# Patient Record
Sex: FEMALE | Race: WHITE | NOT HISPANIC OR LATINO | Employment: FULL TIME | URBAN - METROPOLITAN AREA
[De-identification: names, ages, dates, MRNs, and addresses within clinical notes are randomized per-mention and may not be internally consistent; named-entity substitution may affect disease eponyms.]

---
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
## 2022-02-23 ENCOUNTER — HOSPITAL ENCOUNTER (EMERGENCY)
Facility: CLINIC | Age: 22
Discharge: HOME | End: 2022-02-23
Attending: EMERGENCY MEDICINE | Admitting: EMERGENCY MEDICINE
Payer: OTHER MISCELLANEOUS

## 2022-02-23 VITALS
TEMPERATURE: 98.4 F | OXYGEN SATURATION: 100 % | RESPIRATION RATE: 16 BRPM | HEART RATE: 61 BPM | DIASTOLIC BLOOD PRESSURE: 71 MMHG | SYSTOLIC BLOOD PRESSURE: 118 MMHG

## 2022-02-23 DIAGNOSIS — S06.0X0A CONCUSSION WITHOUT LOSS OF CONSCIOUSNESS, INITIAL ENCOUNTER: ICD-10-CM

## 2022-02-23 PROCEDURE — 250N000013 HC RX MED GY IP 250 OP 250 PS 637: Performed by: EMERGENCY MEDICINE

## 2022-02-23 PROCEDURE — 99283 EMERGENCY DEPT VISIT LOW MDM: CPT | Performed by: EMERGENCY MEDICINE

## 2022-02-23 PROCEDURE — 250N000011 HC RX IP 250 OP 636: Performed by: EMERGENCY MEDICINE

## 2022-02-23 RX ORDER — EPINEPHRINE 0.3 MG/.3ML
0.3 INJECTION SUBCUTANEOUS
COMMUNITY
Start: 2021-07-14

## 2022-02-23 RX ORDER — CLONIDINE HYDROCHLORIDE 0.1 MG/1
0.1 TABLET ORAL AT BEDTIME
COMMUNITY
Start: 2022-01-22 | End: 2023-11-22

## 2022-02-23 RX ORDER — IBUPROFEN 600 MG/1
600 TABLET, FILM COATED ORAL ONCE
Status: COMPLETED | OUTPATIENT
Start: 2022-02-23 | End: 2022-02-23

## 2022-02-23 RX ORDER — ONDANSETRON 4 MG/1
4 TABLET, ORALLY DISINTEGRATING ORAL ONCE
Status: COMPLETED | OUTPATIENT
Start: 2022-02-23 | End: 2022-02-23

## 2022-02-23 RX ORDER — SUMATRIPTAN 100 MG/1
100 TABLET, FILM COATED ORAL
COMMUNITY
Start: 2021-02-23 | End: 2022-02-23

## 2022-02-23 RX ORDER — FLUOXETINE 40 MG/1
40 CAPSULE ORAL AT BEDTIME
COMMUNITY
Start: 2022-01-22

## 2022-02-23 RX ADMIN — IBUPROFEN 600 MG: 600 TABLET, FILM COATED ORAL at 19:20

## 2022-02-23 RX ADMIN — ONDANSETRON 4 MG: 4 TABLET, ORALLY DISINTEGRATING ORAL at 19:20

## 2022-02-23 NOTE — LETTER
February 23, 2022      To Whom It May Concern:      Riana Ashton was seen in our Emergency Department today, 02/23/22.  I expect her condition to improve over the next 5 days.  She may return to work when improved.    Sincerely,        Aldo Fabian MD

## 2022-02-24 NOTE — ED PROVIDER NOTES
History     Chief Complaint   Patient presents with     Head Injury     HPI  Riana Ashton is a 21 year old female who presents for evaluation of a head injury.  She was working at the [employer] when she turned her head suddenly and smacked her right forehead on a locker handle.  She had sudden onset of headache.  She feels fuzzy.  She has had no loss of consciousness.  No vomiting.  She has had some nausea.  History of probable concussions in the past but not formally diagnosed she states.  Headache is currently 6 out of 10    Allergies:  Allergies   Allergen Reactions     Bee Venom Anaphylaxis     Nickel Rash       Problem List:    There are no problems to display for this patient.       Past Medical History:    Past Medical History:   Diagnosis Date     Fibromyalgia 2018     Migraine      PONV (postoperative nausea and vomiting)        Past Surgical History:    Past Surgical History:   Procedure Laterality Date     ARTHROSCOPY KNEE WITH FIXATION OF OSTEOCHONDRAL DISSECANS Left 3/9/2018    Procedure: ARTHROSCOPY KNEE WITH FIXATION OF OSTEOCHONDRAL DISSECANS;  1.  Left knee arthroscopic osteochondritis dissecans lesion fixation.   2.  Bone grafting and bone marrow aspirate concentrate harvest and application.   ;  Surgeon: Johnny Milner MD;  Location:  OR     ORTHOPEDIC SURGERY      fx femur lt. with surgery and then removal of pins     OSTEOCHONDRAL ALLOGRAFT TRANSFER SYSTEM PROCEDURE KNEE Left 2018    Procedure: Left knee arthroscopy, removal of loose body, osteochondral allograft transplant of the medial femoral condyle;  Surgeon: Johnny Milner MD;  Location:  OR       Family History:    History reviewed. No pertinent family history.    Social History:  Marital Status:  Single [1]  Social History     Tobacco Use     Smoking status: Former Smoker     Packs/day: 0.25     Types: Cigarettes     Quit date: 2021     Years since quittin.2     Smokeless tobacco: Former User   Substance Use  Topics     Alcohol use: Yes     Comment: beer rarely with friends     Drug use: No        Medications:    acetaminophen (TYLENOL) 325 MG tablet  aspirin-acetaminophen-caffeine (EXCEDRIN MIGRAINE) 250-250-65 MG tablet  cloNIDine (CATAPRES) 0.1 MG tablet  EPINEPHrine (ANY BX GENERIC EQUIV) 0.3 MG/0.3ML injection 2-pack  erenumab-aooe (AIMOVIG) 70 MG/ML injection  FLUoxetine (PROZAC) 40 MG capsule  levonorgestrel (KYLEENA) 19.5 MG IUD  ondansetron (ZOFRAN-ODT) 4 MG ODT tab  SUMAtriptan (IMITREX) 100 MG tablet  tiZANidine (ZANAFLEX) 4 MG tablet  ibuprofen (ADVIL/MOTRIN) 600 MG tablet  oxyCODONE IR (ROXICODONE) 5 MG tablet  senna-docusate (SENOKOT-S;PERICOLACE) 8.6-50 MG per tablet          Review of Systems  All other systems are reviewed and are negative    Physical Exam          Physical Exam  Vitals and nursing note reviewed.   Constitutional:       General: She is not in acute distress.     Appearance: She is well-developed. She is not diaphoretic.   HENT:      Head:      Comments: Abrasion to the right forehead.  No deformity.  No significant swelling.  No bony step-off or crepitus.  Eyes:      General: No scleral icterus.     Pupils: Pupils are equal, round, and reactive to light.   Cardiovascular:      Rate and Rhythm: Normal rate and regular rhythm.      Heart sounds: Normal heart sounds. No murmur heard.  Pulmonary:      Effort: No respiratory distress.      Breath sounds: No stridor. No wheezing or rales.   Abdominal:      Palpations: Abdomen is soft.      Tenderness: There is no abdominal tenderness.   Musculoskeletal:         General: No tenderness.      Cervical back: Normal range of motion and neck supple.   Skin:     General: Skin is warm and dry.      Coloration: Skin is not pale.      Findings: No erythema or rash.   Neurological:      Mental Status: She is alert.         ED Course            Procedures              Critical Care time:  none        8:00 PM patient feeling improved.  No vomiting.   Moderate headache.  Wanting to go home       No results found for this or any previous visit (from the past 24 hour(s)).    Medications   ibuprofen (ADVIL/MOTRIN) tablet 600 mg (600 mg Oral Given 2/23/22 1920)   ondansetron (ZOFRAN-ODT) ODT tab 4 mg (4 mg Oral Given 2/23/22 1920)       Assessments & Plan (with Medical Decision Making)  21-year-old female presents after head injury as described above striking her head on a locker handle.  No loss of consciousness, neurological deficits.  Does not appear likely to have skull fracture or intracranial hemorrhage.  Do suspect she suffered a concussion.  Have recommended some rest and work note given.  Tylenol ibuprofen as needed.  She has Zofran as needed for nausea.  Return if repetitive vomiting or other concern.     I have reviewed the nursing notes.    I have reviewed the findings, diagnosis, plan and need for follow up with the patient.       New Prescriptions    No medications on file       Final diagnoses:   Concussion without loss of consciousness, initial encounter       2/23/2022   [facility] EMERGENCY DEPT     Aldo Fabian MD  02/2000
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
## 2023-11-21 ENCOUNTER — APPOINTMENT (OUTPATIENT)
Dept: GENERAL RADIOLOGY | Facility: CLINIC | Age: 23
End: 2023-11-21
Attending: EMERGENCY MEDICINE
Payer: OTHER MISCELLANEOUS

## 2023-11-21 ENCOUNTER — HOSPITAL ENCOUNTER (EMERGENCY)
Facility: CLINIC | Age: 23
Discharge: HOME | End: 2023-11-21
Attending: EMERGENCY MEDICINE | Admitting: EMERGENCY MEDICINE
Payer: OTHER MISCELLANEOUS

## 2023-11-21 VITALS
OXYGEN SATURATION: 100 % | HEART RATE: 74 BPM | HEIGHT: 67 IN | SYSTOLIC BLOOD PRESSURE: 137 MMHG | WEIGHT: 149 LBS | BODY MASS INDEX: 23.39 KG/M2 | TEMPERATURE: 98.4 F | RESPIRATION RATE: 18 BRPM | DIASTOLIC BLOOD PRESSURE: 92 MMHG

## 2023-11-21 DIAGNOSIS — M24.811 INTERNAL DERANGEMENT OF RIGHT SHOULDER: ICD-10-CM

## 2023-11-21 PROCEDURE — 250N000013 HC RX MED GY IP 250 OP 250 PS 637: Performed by: EMERGENCY MEDICINE

## 2023-11-21 PROCEDURE — 99283 EMERGENCY DEPT VISIT LOW MDM: CPT | Performed by: EMERGENCY MEDICINE

## 2023-11-21 PROCEDURE — 73030 X-RAY EXAM OF SHOULDER: CPT | Mod: RT

## 2023-11-21 RX ORDER — ACETAMINOPHEN 325 MG/1
975 TABLET ORAL ONCE
Status: COMPLETED | OUTPATIENT
Start: 2023-11-21 | End: 2023-11-21

## 2023-11-21 RX ADMIN — ACETAMINOPHEN 975 MG: 325 TABLET, FILM COATED ORAL at 10:54

## 2023-11-21 ASSESSMENT — ACTIVITIES OF DAILY LIVING (ADL): ADLS_ACUITY_SCORE: 33

## 2023-11-21 NOTE — ED TRIAGE NOTES
Reports right shoulder pain from a work related injury.      Triage Assessment (Adult)       Row Name 11/21/23 1024          Triage Assessment    Airway WDL WDL        Respiratory WDL    Respiratory WDL WDL        Skin Circulation/Temperature WDL    Skin Circulation/Temperature WDL WDL        Cardiac WDL    Cardiac WDL WDL        Peripheral/Neurovascular WDL    Peripheral Neurovascular WDL WDL        Cognitive/Neuro/Behavioral WDL    Cognitive/Neuro/Behavioral WDL WDL

## 2023-11-21 NOTE — DISCHARGE INSTRUCTIONS
Your x-ray looked good.  I recommend conservative care with rest ice and range of motion as tolerated.  I recommend no heavy lifting until your symptoms have resolved.  I did put in a referral to sports medicine for follow-up.  Hopefully this gets better quickly.
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
## 2023-11-21 NOTE — LETTER
November 21, 2023      To Whom It May Concern:      Riana Ashton was seen in our Emergency Department today, 11/21/23.  I expect her condition to improve over the next 7-10 days.  She may return to work when improved but must rest the right arm until symptoms have resolved.     Sincerely,        Dylan Leonardo MD
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
## 2023-11-22 ENCOUNTER — OFFICE VISIT (OUTPATIENT)
Dept: ORTHOPEDICS | Facility: CLINIC | Age: 23
End: 2023-11-22
Attending: EMERGENCY MEDICINE
Payer: OTHER MISCELLANEOUS

## 2023-11-22 DIAGNOSIS — S46.911A SHOULDER STRAIN, RIGHT, INITIAL ENCOUNTER: Primary | ICD-10-CM

## 2023-11-22 DIAGNOSIS — S16.1XXA CERVICAL STRAIN, ACUTE, INITIAL ENCOUNTER: ICD-10-CM

## 2023-11-22 PROCEDURE — 99204 OFFICE O/P NEW MOD 45 MIN: CPT | Performed by: PHYSICIAN ASSISTANT

## 2023-11-22 RX ORDER — PREDNISONE 20 MG/1
TABLET ORAL
Qty: 20 TABLET | Refills: 0 | Status: SHIPPED | OUTPATIENT
Start: 2023-11-22 | End: 2023-12-05

## 2023-11-22 NOTE — LETTER
REPORT OF WORK COMP    [facility] [place]  [address]  [address]  [place] [address]  [phone]      PATIENT DATA    Employee Name: Riana Ashton      : 2000     #: [id number]    Work related injury: Yes  Employer at time of injury: [employer]   Employer contact & phone: unknown  Employed elsewhere? No  Workers' Compensation Carrier/Managed Care Plan:  Unknown    Today's date: 2023  Date of injury: 23  Date of first visit: 2023      PROVIDER EVALUATION: Please fill in as needed.  Please give copy to employee for employer.    1. Diagnosis: Right-sided cervical neck pain/strain, right side shoulder strain with possible rotator cuff injury    2. Treatment: Patient to start oral prednisone taper, tizanidine muscle relaxer, work restrictions, supplemental Tylenol for pain, rest, ice and topical muscle rubs as needed.  3. Medication: Prednisone oral taper, tizanidine muscle relaxer, Tylenol and topical muscle rubs  NOTE: When ordering a medication, [place] Rules require [place] or WC on prescriptions.    4. No work from: N/A  5. Return to work date: 2023     ** WITH RESTRICTIONS?  Yes, patient may do unlimited seated/clerical duties.  No use of right upper extremity for lifting more than 2 pounds with the right hand.  No activities above shoulder height.  No internal rotation of the shoulder past lateral hip level (reaching behind her back)      RESTRICTIONS: Unlimited unless listed.  Restrictions apply to home and leisure also.  If work restrictions is not available, the employee is totally disabled.    Maximum Medical Improvement (Date): TBD   Any Permanent Partial Disability? Deferred to future exam/consult.    Provider comments: Patient was reassessed in 2 weeks    Medical Examiner: Benoit Nazario PA-C            License or registration: [place] [id number]    Next appointment: 2 weeks    CC: Employer, [employer]  Plan/Payor, Patient

## 2023-11-22 NOTE — PROGRESS NOTES
ASSESSMENT & PLAN     Today we discussed the underlying etiology/pathology of patient's   1. Shoulder strain, right, initial encounter    2. Cervical strain, acute, initial encounter      -We discussed that patient has had only minimal improvement 10% of her cervical and right shoulder discomfort symptoms.  She does have intermittent numbness and tingling involving the right upper extremity which she believes affects all of her digits on her right hand.  - We will send the patient for right shoulder MRI to evaluate for rotator cuff pathology.  Cannot rule out cervical pathology or possible cervical radiculopathy at this time.  Patient is to pay attention to numbness/tingling symptoms to identify exact potential dermatome in which fingers are involved and will give me an update over the next 5 days  - Patient to continue tizanidine to help with cervical spine tightness and symptoms  - I will contact the patient with MRI results of her right shoulder when known and further treatment plan will be updated.  If inflammation is noted with no other structural abnormality we will proceed with possible right shoulder subacromial cortisone injection with rehab/PT program.  - We will continue to monitor cervical spine for possible cervical radiculopathy.  - Workability form updated maintaining current work restrictions.  -I did speak with Jayjay the patient's work comp carrier/representative today.  Clinic notes have not been received yet.  MRI will not be authorized until review of clinic notes which were faxed to Jayjay today from visit 11/23/2023 and today's visit.    -Call direct clinic number [[phone]] at any time with questions or concerns in regards to your recent office visit with me.     Benoit Nazario PA-C  [facility]      SUBJECTIVE  Riana RANJITH Poli is a/an 23 year old female who is seen for follow up of right shoulder injury. The patient is seen by themselves.  This is a WORK COMP.  INJURY/CLAIM  Date of injury: 23  Employer: [employer]  Is patient employed/working at any other location?: NO      Since last visit on 23 patient has mild improvement in pain and function for her cervical spine and right shoulder. Patient reports a new burning pain in the shoulder joint and underneath the shoulder blade with numbness/tingling involving the right hand, she states it involves all digits but has not paid attention specifically to which digits are involved.  She denies any symptoms on the left upper extremity.  Range of motion of the shoulder on the right side has improved but continues to be painful above shoulder height.  She has been off work since last evaluation.      Has previous treatment plan been beneficial for condition: yes   Location of Pain: right shoulder and cervical neck  Rating of Pain at worst: 7/10  Rating of Pain Currently: 4/10  Worsened by: shoulder flexion of the right shoulder above shoulder height and movement of the cervical spine  Better with: mild with treatments tried  Treatments tried: rest/activity avoidance, ice, other medications: Prednisone (Medrol), and home exercises and tizanidine as needed-6 tablets utilized  Quality: aching, sharp, stabbing, numb, burning, radiating  Associated symptoms: numbness, tingling, weakness of the arm with use, locking or catching, and feeling of instability      Past Medical History:   Diagnosis Date    Fibromyalgia 2018    Migraine     PONV (postoperative nausea and vomiting)      Social History     Socioeconomic History    Marital status: Single   Tobacco Use    Smoking status: Former     Packs/day: .25     Types: Cigarettes     Quit date: 2021     Years since quittin.9    Smokeless tobacco: Former   Substance and Sexual Activity    Alcohol use: Yes     Comment: beer rarely with friends    Drug use: No    Sexual activity: Yes     Birth control/protection: I.U.D.         Patient's past  medical, surgical, social, and family histories were personally reviewed today and no changes are noted.  REVIEW OF SYSTEMS:  Review of Systems    OBJECTIVE:  There were no vitals taken for this visit.   General: healthy, alert and in no distress  HEENT: no scleral icterus or conjunctival erythema  Skin: no suspicious lesions or rash. No jaundice.  CV: no pedal edema  Resp: normal respiratory effort without conversational dyspnea   Psych: normal mood and affect  Gait: normal steady gait with appropriate coordination and balance  Neuro: Normal light sensory exam of lower extremity      MSK:  Exam shows a pleasant 23-year-old female who ambulates weightbearing without assistive device.  She is alert and orientated x 3.  Examination of the cervical spine shows she has adequate range of motion with slight pulling in the posterior right side cervical column.  Range of motion of the neck does not seem to cause any right-sided upper extremity numbness/tingling.  Slight tenderness still noted on the paraspinous muscles of C3-C8 on the right side.  Patient is nontender at the sternoclavicular joint but does show tenderness along the clavicle along the middle one third at the insertion of the SCM muscle of the cervical column.  No pain at the AC joint bilaterally.  Mild tenderness over the anterior subacromial space on the right shoulder.  Patient's forward flexion on the left shoulder is 170 degrees compared to improve flexion to 130 degrees.  Internal rotation on the left is to the T5 level versus internal rotation to the T10 level which is improved.  Abduction on the left is 150 degrees compared to 120 degrees on the right which also is improved.  Rotator cuff strength is 5 out of 5 with isolation of the external rotators as well as the internal rotators without significant discomfort.  Patient does have pain with isolated testing of the supraspinatus with empty can testing on the right compared to the left.  No kimmy  crepitation of the shoulder is noted.  Rotator cuff strength of the supraspinatus appears to be 4+/5 which is improved from previous exam.  Patient has forward flexion arc painful from approximately 110 degrees to 130 degrees on the right side.  Patient appears grossly neurovascularly intact through the right upper extremity.  No upper extremity swelling.  Radial, ulnar and median nerves intact.        Patient's conditions were thoroughly discussed during today's visit with total time spent face-to-face with the patient and documentation being 30 minutes.    Benoit Nazario PA-C  [facility]    This note was completed in part using a voice recognition software, any grammatical or context distortion are unintentional and inherent to the software.
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
## 2023-11-22 NOTE — PATIENT INSTRUCTIONS
Today we discussed the underlying etiology/pathology of patient's   1. Shoulder strain, right, initial encounter    2. Cervical strain, acute, initial encounter      -We discussed patient sustained a work comp injury to her right upper extremity and cervical region on 11/21/2023.  - Due to acute pain physical exam still is unclear on severity of muscle skeletal injury.  Patient has improved in regards to some shoulder function since assessment in the ED yesterday.  - Patient will start oral prednisone taper to decrease inflammation throughout the cervical as well as shoulder girdle.  She will use tizanidine for muscle spasm.  She may supplement up to 3000 mg of Tylenol daily for additional pain control and utilize any topical agent for symptomatic comfort including Biofreeze, Voltaren 1% gel, Tiger balm or IcyHot.  Patient may also utilize ice and heat appropriately.  - Patient will have work restrictions in place.  Patient will be reassessed in 2 weeks.  - If patient is not improving as expected over the next 2 weeks advanced imaging of the right shoulder with MRI may be warranted to evaluate for rotator cuff injury.    -Call direct clinic number [[phone]] at any time with questions or concerns in regards to your recent office visit with me.     Benoit Nazario PA-C  [facility]

## 2023-12-05 ENCOUNTER — OFFICE VISIT (OUTPATIENT)
Dept: ORTHOPEDICS | Facility: CLINIC | Age: 23
End: 2023-12-05
Payer: OTHER MISCELLANEOUS

## 2023-12-05 ENCOUNTER — TELEPHONE (OUTPATIENT)
Dept: ORTHOPEDICS | Facility: CLINIC | Age: 23
End: 2023-12-05

## 2023-12-05 DIAGNOSIS — S16.1XXA CERVICAL STRAIN, ACUTE, INITIAL ENCOUNTER: ICD-10-CM

## 2023-12-05 DIAGNOSIS — S46.911A SHOULDER STRAIN, RIGHT, INITIAL ENCOUNTER: Primary | ICD-10-CM

## 2023-12-05 PROCEDURE — 99214 OFFICE O/P EST MOD 30 MIN: CPT | Performed by: PHYSICIAN ASSISTANT

## 2023-12-05 NOTE — LETTER
REPORT OF WORK COMP    [facility] [place]  [address]  [address]  [place] [address]  [phone]        PATIENT DATA     Employee Name: Riana Ashton      : 2000     #: [id number]     Work related injury: Yes  Employer at time of injury: [employer]   Employer contact & phone: unknown  Employed elsewhere? No  Workers' Compensation Carrier/Managed Care Plan:  Unknown     Today's date: 2023    Date of injury: 23  Date of first visit: 2023        PROVIDER EVALUATION: Please fill in as needed.  Please give copy to employee for employer.     1. Diagnosis: Right-sided cervical neck pain/strain, right side shoulder strain with possible rotator cuff injury     2. Treatment: Patient completed oral prednisone taper with 10% improvement, tizanidine muscle relaxer as needed, work restrictions, supplemental Tylenol for pain, rest, ice and topical muscle rubs as needed.  3. Medication:  tizanidine muscle relaxer, Tylenol and topical muscle rubs  NOTE: When ordering a medication, [place] Rules require Work Comp or WC on prescriptions.     4. No work from: N/A  5. Return to work date: 2023       ** WITH RESTRICTIONS?  Yes, patient may do unlimited seated/clerical duties.  No use of right upper extremity for lifting more than 2 pounds with the right hand.  No activities above shoulder height.  No internal rotation of the shoulder past lateral hip level (reaching behind her back)        RESTRICTIONS: Unlimited unless listed.  Restrictions apply to home and leisure also.  If work restrictions is not available, the employee is totally disabled.     Maximum Medical Improvement (Date): TBD              Any Permanent Partial Disability? Deferred to future exam/consult.     Provider comments: Patient care plan to be updated after MRI results of right shoulder are known and discussed-  I will call patient with results      Medical Examiner:  Benoit Nazario PA-C            License or registration: [place] [id number]     Next appointment: after right shoulder MRI has been completed

## 2023-12-05 NOTE — LETTER
12/5/2023         RE: Riana Ashton  [address] [place] [address]        Dear Colleague,    Thank you for referring your patient, Riana Ashton, to the [facility] [place]. Please see a copy of my visit note below.    ASSESSMENT & PLAN     Today we discussed the underlying etiology/pathology of patient's   1. Shoulder strain, right, initial encounter    2. Cervical strain, acute, initial encounter      -We discussed that patient has had only minimal improvement 10% of her cervical and right shoulder discomfort symptoms.  She does have intermittent numbness and tingling involving the right upper extremity which she believes affects all of her digits on her right hand.  - We will send the patient for right shoulder MRI to evaluate for rotator cuff pathology.  Cannot rule out cervical pathology or possible cervical radiculopathy at this time.  Patient is to pay attention to numbness/tingling symptoms to identify exact potential dermatome in which fingers are involved and will give me an update over the next 5 days  - Patient to continue tizanidine to help with cervical spine tightness and symptoms  - I will contact the patient with MRI results of her right shoulder when known and further treatment plan will be updated.  If inflammation is noted with no other structural abnormality we will proceed with possible right shoulder subacromial cortisone injection with rehab/PT program.  - We will continue to monitor cervical spine for possible cervical radiculopathy.  - Workability form updated maintaining current work restrictions.  -I did speak with Jayjay the patient's work comp carrier/representative today.  Clinic notes have not been received yet.  MRI will not be authorized until review of clinic notes which were faxed to Jayjay today from visit 11/23/2023 and today's visit.    -Call direct clinic number [[phone]] at any time with questions or concerns in regards to  your recent office visit with me.     Benoit Nazario PA-C  [facility]      SUBJECTIVE  Riana Ashton is a/an 23 year old female who is seen for follow up of right shoulder injury. The patient is seen by themselves.  This is a WORK COMP. INJURY/CLAIM  Date of injury: 11/21/23  Employer: [employer]  Is patient employed/working at any other location?: NO      Since last visit on 11/22/23 patient has mild improvement in pain and function for her cervical spine and right shoulder. Patient reports a new burning pain in the shoulder joint and underneath the shoulder blade with numbness/tingling involving the right hand, she states it involves all digits but has not paid attention specifically to which digits are involved.  She denies any symptoms on the left upper extremity.  Range of motion of the shoulder on the right side has improved but continues to be painful above shoulder height.  She has been off work since last evaluation.      Has previous treatment plan been beneficial for condition: yes   Location of Pain: right shoulder and cervical neck  Rating of Pain at worst: 7/10  Rating of Pain Currently: 4/10  Worsened by: shoulder flexion of the right shoulder above shoulder height and movement of the cervical spine  Better with: mild with treatments tried  Treatments tried: rest/activity avoidance, ice, other medications: Prednisone (Medrol), and home exercises and tizanidine as needed-6 tablets utilized  Quality: aching, sharp, stabbing, numb, burning, radiating  Associated symptoms: numbness, tingling, weakness of the arm with use, locking or catching, and feeling of instability      Past Medical History:   Diagnosis Date     Fibromyalgia 2018     Migraine      PONV (postoperative nausea and vomiting)      Social History     Socioeconomic History     Marital status: Single   Tobacco Use     Smoking status: Former     Packs/day: .25     Types: Cigarettes      Quit date: 2021     Years since quittin.9     Smokeless tobacco: Former   Substance and Sexual Activity     Alcohol use: Yes     Comment: beer rarely with friends     Drug use: No     Sexual activity: Yes     Birth control/protection: I.U.D.         Patient's past medical, surgical, social, and family histories were personally reviewed today and no changes are noted.  REVIEW OF SYSTEMS:  Review of Systems    OBJECTIVE:  There were no vitals taken for this visit.   General: healthy, alert and in no distress  HEENT: no scleral icterus or conjunctival erythema  Skin: no suspicious lesions or rash. No jaundice.  CV: no pedal edema  Resp: normal respiratory effort without conversational dyspnea   Psych: normal mood and affect  Gait: normal steady gait with appropriate coordination and balance  Neuro: Normal light sensory exam of lower extremity      MSK:  [facility] shows a pleasant 23-year-old female who ambulates weightbearing without assistive device.  She is alert and orientated x 3.  Examination of the cervical spine shows she has adequate range of motion with slight pulling in the posterior right side cervical column.  Range of motion of the neck does not seem to cause any right-sided upper extremity numbness/tingling.  Slight tenderness still noted on the paraspinous muscles of C3-C8 on the right side.  Patient is nontender at the sternoclavicular joint but does show tenderness along the clavicle along the middle one third at the insertion of the SCM muscle of the cervical column.  No pain at the AC joint bilaterally.  Mild tenderness over the anterior subacromial space on the right shoulder.  Patient's forward flexion on the left shoulder is 170 degrees compared to improve flexion to 130 degrees.  Internal rotation on the left is to the T5 level versus internal rotation to the T10 level which is improved.  Abduction on the left is 150 degrees compared to 120 degrees on the right which also is improved.  Rotator  cuff strength is 5 out of 5 with isolation of the external rotators as well as the internal rotators without significant discomfort.  Patient does have pain with isolated testing of the supraspinatus with empty can testing on the right compared to the left.  No kimmy crepitation of the shoulder is noted.  Rotator cuff strength of the supraspinatus appears to be 4+/5 which is improved from previous exam.  Patient has forward flexion arc painful from approximately 110 degrees to 130 degrees on the right side.  Patient appears grossly neurovascularly intact through the right upper extremity.  No upper extremity swelling.  Radial, ulnar and median nerves intact.        Patient's conditions were thoroughly discussed during today's visit with total time spent face-to-face with the patient and documentation being 30 minutes.    Benoit Nazario PA-C  [facility]    This note was completed in part using a voice recognition software, any grammatical or context distortion are unintentional and inherent to the software.       Again, thank you for allowing me to participate in the care of your patient.        Sincerely,        Benoit Nazario PA-C

## 2023-12-05 NOTE — PATIENT INSTRUCTIONS
Today we discussed the underlying etiology/pathology of patient's   1. Shoulder strain, right, initial encounter    2. Cervical strain, acute, initial encounter      -We discussed that patient has had only minimal improvement 10% of her cervical and right shoulder discomfort symptoms.  She does have intermittent numbness and tingling involving the right upper extremity which she believes affects all of her digits on her right hand.  - We will send the patient for right shoulder MRI to evaluate for rotator cuff pathology.  Cannot rule out cervical pathology or possible cervical radiculopathy at this time.  Patient is to pay attention to numbness/tingling symptoms to identify exact potential dermatome in which fingers are involved and will give me an update over the next 5 days  - Patient to continue tizanidine to help with cervical spine tightness and symptoms  - I will contact the patient with MRI results of her right shoulder when known and further treatment plan will be updated.  If inflammation is noted with no other structural abnormality we will proceed with possible right shoulder subacromial cortisone injection with rehab/PT program.  - We will continue to monitor cervical spine for possible cervical radiculopathy.  - Workability form updated maintaining current work restrictions.  -I did speak with Jayjay the patient's work comp carrier/representative today.  Clinic notes have not been received yet.  MRI will not be authorized until review of clinic notes which were faxed to Jayjay today from visit 11/23/2023 and today's visit.    -Call direct clinic number [[phone]] at any time with questions or concerns in regards to your recent office visit with me.     Benoit Nazario PA-C  [facility]

## 2023-12-05 NOTE — TELEPHONE ENCOUNTER
"Provided patient's  Representative, Jayjay, with information regarding patient's office visits for 12/5/23 and 11/22/23 for approval of MRI order for the Right shoulder.     LOV notes were faxed to \"Jayjay\" at [phone]. Phone number provided as [phone] (ext. [phone]) with the  claim # as [id number].    Riana Sheridan ATC      "

## 2023-12-06 ENCOUNTER — TELEPHONE (OUTPATIENT)
Dept: ORTHOPEDICS | Facility: CLINIC | Age: 23
End: 2023-12-06

## 2023-12-06 DIAGNOSIS — F40.240 CLAUSTROPHOBIA: Primary | ICD-10-CM

## 2023-12-06 RX ORDER — DIAZEPAM 5 MG
5 TABLET ORAL ONCE
Qty: 1 TABLET | Refills: 0 | Status: SHIPPED | OUTPATIENT
Start: 2023-12-06 | End: 2023-12-06

## 2023-12-06 NOTE — TELEPHONE ENCOUNTER
[facility] Call Center    Phone Message    May a detailed message be left on voicemail: yes     Reason for Call: Other: Claustrophobic and need medication before MRI on 12/09.     Action Taken: Message routed to:  Other: [facility]    Travel Screening: Not Applicable

## 2023-12-06 NOTE — TELEPHONE ENCOUNTER
Called patient to inform her Benoit placed a Rx for Valium for her to  at her preferred pharmacy. Also informed patient she will take that 30 minutes prior to the MRI and will need a  to and from the MRI.     Patient verbalized understanding and agreement and confirmed she had a  planned for that day. Patient will be picking up the prescription today or tomorrow.     Riana Sheridan, ATC

## 2023-12-06 NOTE — TELEPHONE ENCOUNTER
1 tablet of Valium prescribed to patient's pharmacy on file.  Please inform patient she will need a  to and from her radiology appointment.  Patient to take medication 30 minutes prior to her MRI scan.

## 2023-12-06 NOTE — TELEPHONE ENCOUNTER
Patient has a right shoulder MRI on 12/09/23.    Please see patient's call regarding needing medication for it since she is claustrophobic.     Vianca Sandoval, MOHSEN, LAT, ATC  Certified Athletic Trainer

## 2023-12-07 NOTE — TELEPHONE ENCOUNTER
Received fax from  Rep, Jayjay Caldwell, stating patient's MRI was approved. Copy sent to scan.    Ashley Jade MBA, ATC

## 2023-12-09 ENCOUNTER — HOSPITAL ENCOUNTER (OUTPATIENT)
Dept: MRI IMAGING | Facility: CLINIC | Age: 23
Discharge: HOME | End: 2023-12-09
Attending: PHYSICIAN ASSISTANT | Admitting: PHYSICIAN ASSISTANT
Payer: OTHER MISCELLANEOUS

## 2023-12-09 DIAGNOSIS — S46.911A SHOULDER STRAIN, RIGHT, INITIAL ENCOUNTER: ICD-10-CM

## 2023-12-09 PROCEDURE — 73221 MRI JOINT UPR EXTREM W/O DYE: CPT | Mod: 26 | Performed by: RADIOLOGY

## 2023-12-09 PROCEDURE — 73221 MRI JOINT UPR EXTREM W/O DYE: CPT | Mod: RT

## 2023-12-11 ENCOUNTER — TELEPHONE (OUTPATIENT)
Dept: ORTHOPEDICS | Facility: CLINIC | Age: 23
End: 2023-12-11

## 2023-12-11 DIAGNOSIS — M25.511 ACUTE PAIN OF RIGHT SHOULDER: Primary | ICD-10-CM

## 2023-12-11 DIAGNOSIS — S16.1XXD ACUTE STRAIN OF NECK MUSCLE, SUBSEQUENT ENCOUNTER: ICD-10-CM

## 2023-12-11 NOTE — TELEPHONE ENCOUNTER
I spoke with patient about her right shoulder MRI results.  Results are normal.  Symptoms are improving and are intermittent per patient report today.  Occasional but improving tingling down right arm into digits 2-5 only at night.  Patient will be referred to PT rehab at [facility] per patient request.  I will reassess her in 3 weeks.  All questions addressed today  Right shoulder MRI results   IMPRESSION:  1. No evidence of full-thickness tear or tendon retraction in the  right shoulder rotator cuff tendons.     2. Normal muscle bulk of the right shoulder rotator cuff musculature.      3. Normal-appearing biceps tendon.     4. No osteoarthrosis at the acromioclavicular or glenohumeral joints.

## 2023-12-15 ENCOUNTER — THERAPY VISIT (OUTPATIENT)
Dept: PHYSICAL THERAPY | Facility: CLINIC | Age: 23
End: 2023-12-15
Attending: PHYSICIAN ASSISTANT
Payer: OTHER MISCELLANEOUS

## 2023-12-15 DIAGNOSIS — M25.511 ACUTE PAIN OF RIGHT SHOULDER: ICD-10-CM

## 2023-12-15 DIAGNOSIS — S16.1XXD ACUTE STRAIN OF NECK MUSCLE, SUBSEQUENT ENCOUNTER: ICD-10-CM

## 2023-12-15 PROCEDURE — 97110 THERAPEUTIC EXERCISES: CPT | Mod: GP

## 2023-12-15 PROCEDURE — 97161 PT EVAL LOW COMPLEX 20 MIN: CPT | Mod: GP
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
## 2024-03-01 ENCOUNTER — HOSPITAL ENCOUNTER (EMERGENCY)
Facility: CLINIC | Age: 24
Discharge: HOME | End: 2024-03-01
Attending: EMERGENCY MEDICINE | Admitting: EMERGENCY MEDICINE
Payer: OTHER MISCELLANEOUS

## 2024-03-01 ENCOUNTER — APPOINTMENT (OUTPATIENT)
Dept: GENERAL RADIOLOGY | Facility: CLINIC | Age: 24
End: 2024-03-01
Attending: EMERGENCY MEDICINE
Payer: OTHER MISCELLANEOUS

## 2024-03-01 VITALS
TEMPERATURE: 98.8 F | RESPIRATION RATE: 18 BRPM | OXYGEN SATURATION: 100 % | HEIGHT: 67 IN | HEART RATE: 76 BPM | WEIGHT: 164 LBS | SYSTOLIC BLOOD PRESSURE: 116 MMHG | BODY MASS INDEX: 25.74 KG/M2 | DIASTOLIC BLOOD PRESSURE: 80 MMHG

## 2024-03-01 DIAGNOSIS — M79.641 PAIN OF RIGHT HAND: ICD-10-CM
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
## 2024-03-01 DIAGNOSIS — S63.91XA HAND SPRAIN, RIGHT, INITIAL ENCOUNTER: ICD-10-CM

## 2024-03-01 DIAGNOSIS — S69.91XA HAND INJURY, RIGHT, INITIAL ENCOUNTER: ICD-10-CM

## 2024-03-01 PROCEDURE — 250N000013 HC RX MED GY IP 250 OP 250 PS 637: Performed by: EMERGENCY MEDICINE

## 2024-03-01 PROCEDURE — 99284 EMERGENCY DEPT VISIT MOD MDM: CPT

## 2024-03-01 PROCEDURE — 73130 X-RAY EXAM OF HAND: CPT | Mod: RT

## 2024-03-01 PROCEDURE — 99283 EMERGENCY DEPT VISIT LOW MDM: CPT | Performed by: EMERGENCY MEDICINE

## 2024-03-01 RX ORDER — ACETAMINOPHEN 500 MG
1000 TABLET ORAL ONCE
Status: COMPLETED | OUTPATIENT
Start: 2024-03-01 | End: 2024-03-01

## 2024-03-01 RX ORDER — IBUPROFEN 600 MG/1
600 TABLET, FILM COATED ORAL ONCE
Status: COMPLETED | OUTPATIENT
Start: 2024-03-01 | End: 2024-03-01

## 2024-03-01 RX ADMIN — ACETAMINOPHEN 1000 MG: 500 TABLET ORAL at 12:24

## 2024-03-01 RX ADMIN — IBUPROFEN 600 MG: 600 TABLET, FILM COATED ORAL at 12:24

## 2024-03-01 ASSESSMENT — COLUMBIA-SUICIDE SEVERITY RATING SCALE - C-SSRS
2. HAVE YOU ACTUALLY HAD ANY THOUGHTS OF KILLING YOURSELF IN THE PAST MONTH?: NO
6. HAVE YOU EVER DONE ANYTHING, STARTED TO DO ANYTHING, OR PREPARED TO DO ANYTHING TO END YOUR LIFE?: NO
1. IN THE PAST MONTH, HAVE YOU WISHED YOU WERE DEAD OR WISHED YOU COULD GO TO SLEEP AND NOT WAKE UP?: NO

## 2024-03-01 ASSESSMENT — ACTIVITIES OF DAILY LIVING (ADL)
ADLS_ACUITY_SCORE: 35
ADLS_ACUITY_SCORE: 35
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
## 2024-03-01 NOTE — LETTER
2000      To Whom it may concern:    Riana Ashton was seen in our Emergency Department today, 03/01/24 for an injury that was reported to be work related.      The injury occurred on 3/1/24.  Diagnosis: Right hand sprain, injury, pain.      For the next 5 days she should not work.      Clearance for return to work per clinic follow-up.    Follow up: In the next week.    Sincerely,        Tatiana Hannah MD

## 2024-03-01 NOTE — DISCHARGE INSTRUCTIONS
Rest, ice, elevate hand to decrease pain and swelling.  Okay to alternate with heat if desired.    Use the hand/wrist splint for comfort and protection.    Alternate Tylenol and ibuprofen if needed for pain.    Referral sent for orthopedic follow-up.    Return for significant worsening, changes or concerns.    I hope that you heal quickly!
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
## 2024-03-06 ENCOUNTER — OFFICE VISIT (OUTPATIENT)
Dept: ORTHOPEDICS | Facility: CLINIC | Age: 24
End: 2024-03-06
Payer: OTHER MISCELLANEOUS

## 2024-03-06 VITALS
BODY MASS INDEX: 25.74 KG/M2 | DIASTOLIC BLOOD PRESSURE: 71 MMHG | HEIGHT: 67 IN | WEIGHT: 164 LBS | SYSTOLIC BLOOD PRESSURE: 103 MMHG

## 2024-03-06 DIAGNOSIS — S63.91XA HAND SPRAIN, RIGHT, INITIAL ENCOUNTER: ICD-10-CM
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
## 2024-03-06 DIAGNOSIS — M79.641 PAIN OF RIGHT HAND: ICD-10-CM

## 2024-03-06 DIAGNOSIS — S69.91XA HAND INJURY, RIGHT, INITIAL ENCOUNTER: ICD-10-CM

## 2024-03-06 PROCEDURE — 99204 OFFICE O/P NEW MOD 45 MIN: CPT | Performed by: FAMILY MEDICINE

## 2024-03-06 NOTE — PATIENT INSTRUCTIONS
1. Hand injury, right, initial encounter    2. Work related injury    3. Pain of right hand    4. Hand sprain, right, initial encounter      -Patient has right hand pain due to a finger sprain from a trauma that occurred at work  -I personally reviewed x-rays performed on 3/1/2024 which shows no acute fracture, dislocation or osseous abnormalities  -Patient has full range of motion and strength of her hand  -Patient is cleared to return to work full duty without restrictions  -Paperwork was filled out and given to the patient today  -Call direct clinic number [[phone]] at any time with questions or concerns.    Albert Yeo MD [facility]  [facility] [place]
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
## 2024-05-10 ENCOUNTER — TELEPHONE (OUTPATIENT)
Dept: ORTHOPEDICS | Facility: CLINIC | Age: 24
End: 2024-05-10

## 2024-05-10 NOTE — TELEPHONE ENCOUNTER
Reason for Call:  Form, our goal is to have forms completed with 7 days, however, some forms may require a visit or additional information.    Type of letter, form or note:  work comp - MMI    Who is the form from?:  [facility]    Where did the form come from: form was mailed in    Phone number of person requesting form: [phone]  Can we leave a detailed message on this number:  YES    Desired completion date of form: asap      How will form be returned?:  fax to [phone]    Has the patient signed a consent form for release of information (may be included with form)? Not Applicable    Form was started and place in Provider Basket for provider review/ completion at .     Ashlye Jade, ATC

## 2024-05-17 NOTE — TELEPHONE ENCOUNTER
Forms completed by provider. Fax sent to desired location. Copy sent to scan and original placed in accordion folder in sports medicine office.     Rell Ledesma, Visit Facilitator

## 2024-05-29 NOTE — TELEPHONE ENCOUNTER
Received 2nd request for this form from . Re-faxed form that was completed by Dr. Yeo dated 5/15/24. Copy placed back in accordion folder.    Ashley Jade ATC

## (undated) DEVICE — SU ETHILON 3-0 PS-2 18" 1669H

## (undated) DEVICE — TOURNIQUET CUFF 30" REPRO BLUE 60-7070-105

## (undated) DEVICE — CAST PADDING 6" UNSTERILE 9046

## (undated) DEVICE — CAST PADDING 6" STERILE 9046S

## (undated) DEVICE — BLADE SHAVER ARTHRO 4.2MM CUDA CVD C9256

## (undated) DEVICE — DECANTER VIAL 2006S

## (undated) DEVICE — GLOVE PROTEXIS BLUE W/NEU-THERA 8.0  2D73EB80

## (undated) DEVICE — SPECIMEN CONTAINER 4OZ

## (undated) DEVICE — BLADE SHAVER ARTHRO 5.5MM GATOR 9260A

## (undated) DEVICE — BLADE SHAVER ARTHRO 4.2MM CUDA C9254

## (undated) DEVICE — LINEN HALF SHEET 5512

## (undated) DEVICE — Device

## (undated) DEVICE — TOURNIQUET CUFF 34" REPRO BROWN 60-7070-106

## (undated) DEVICE — TUBING ARTHRO CONMED/LINVATEC PUMP BLUE INFLOW 10K100

## (undated) DEVICE — SOL NACL 0.9% IRRIG 3000ML BAG 2B7477

## (undated) DEVICE — LINEN DRAPE 54X72" 5467

## (undated) DEVICE — LINEN FULL SHEET 5511

## (undated) DEVICE — GLOVE PROTEXIS POWDER FREE 7.5 ORTHOPEDIC 2D73ET75

## (undated) DEVICE — LIGHT HANDLE X2

## (undated) DEVICE — BLADE SAW SAGITTAL STRK 20.7X85X0.89MM 2108-109-000S13

## (undated) DEVICE — SUCTION MANIFOLD NEPTUNE 2 SYS 4 PORT 0702-020-000

## (undated) DEVICE — GLOVE PROTEXIS POWDER FREE 8.0 ORTHOPEDIC 2D73ET80

## (undated) DEVICE — APPLICATOR COTTON TIP 6"X2 STERILE LF 6012

## (undated) DEVICE — PREP CHLORAPREP 26ML TINTED ORANGE  260815

## (undated) DEVICE — KIT BLOOD COLLECTION ARTHREX SERIES I ACP BLD DRAW ABS-10011

## (undated) DEVICE — GLOVE PROTEXIS BLUE W/NEU-THERA 7.5  2D73EB75

## (undated) DEVICE — BAG CLEAR TRASH 1.3M 39X33" P4040C

## (undated) DEVICE — BUR SHAVER ARTHRO 3.5MM CYCLONE SPHERICAL H9110

## (undated) DEVICE — COVER FOOTSWITCH W/CINCH 20X24" 923267

## (undated) DEVICE — PACK ARTHROSCOPY KNEE

## (undated) DEVICE — DRSG AQUACEL AG 3.5X6.0" HYDROFIBER 412010

## (undated) RX ORDER — LIDOCAINE HYDROCHLORIDE 10 MG/ML
INJECTION, SOLUTION EPIDURAL; INFILTRATION; INTRACAUDAL; PERINEURAL
Status: DISPENSED
Start: 2018-03-09

## (undated) RX ORDER — PROPOFOL 10 MG/ML
INJECTION, EMULSION INTRAVENOUS
Status: DISPENSED
Start: 2018-03-09

## (undated) RX ORDER — FENTANYL CITRATE 50 UG/ML
INJECTION, SOLUTION INTRAMUSCULAR; INTRAVENOUS
Status: DISPENSED
Start: 2018-03-09

## (undated) RX ORDER — DEXAMETHASONE SODIUM PHOSPHATE 4 MG/ML
INJECTION, SOLUTION INTRA-ARTICULAR; INTRALESIONAL; INTRAMUSCULAR; INTRAVENOUS; SOFT TISSUE
Status: DISPENSED
Start: 2018-03-09

## (undated) RX ORDER — HYDROMORPHONE HYDROCHLORIDE 1 MG/ML
INJECTION, SOLUTION INTRAMUSCULAR; INTRAVENOUS; SUBCUTANEOUS
Status: DISPENSED
Start: 2018-11-05

## (undated) RX ORDER — FENTANYL CITRATE 50 UG/ML
INJECTION, SOLUTION INTRAMUSCULAR; INTRAVENOUS
Status: DISPENSED
Start: 2018-11-05

## (undated) RX ORDER — GLYCOPYRROLATE 0.2 MG/ML
INJECTION INTRAMUSCULAR; INTRAVENOUS
Status: DISPENSED
Start: 2018-03-09

## (undated) RX ORDER — ACETAMINOPHEN 10 MG/ML
INJECTION, SOLUTION INTRAVENOUS
Status: DISPENSED
Start: 2018-03-09

## (undated) RX ORDER — SCOLOPAMINE TRANSDERMAL SYSTEM 1 MG/1
PATCH, EXTENDED RELEASE TRANSDERMAL
Status: DISPENSED
Start: 2018-11-05

## (undated) RX ORDER — ONDANSETRON 2 MG/ML
INJECTION INTRAMUSCULAR; INTRAVENOUS
Status: DISPENSED
Start: 2018-11-05

## (undated) RX ORDER — CEFAZOLIN SODIUM 2 G/100ML
INJECTION, SOLUTION INTRAVENOUS
Status: DISPENSED
Start: 2018-11-05

## (undated) RX ORDER — ROPIVACAINE HYDROCHLORIDE 7.5 MG/ML
INJECTION, SOLUTION EPIDURAL; PERINEURAL
Status: DISPENSED
Start: 2018-11-05

## (undated) RX ORDER — OXYCODONE HYDROCHLORIDE 5 MG/1
TABLET ORAL
Status: DISPENSED
Start: 2018-03-09

## (undated) RX ORDER — ACETAMINOPHEN 325 MG/1
TABLET ORAL
Status: DISPENSED
Start: 2018-11-05

## (undated) RX ORDER — ONDANSETRON 2 MG/ML
INJECTION INTRAMUSCULAR; INTRAVENOUS
Status: DISPENSED
Start: 2018-03-09

## (undated) RX ORDER — CEFAZOLIN SODIUM 2 G/100ML
INJECTION, SOLUTION INTRAVENOUS
Status: DISPENSED
Start: 2018-03-09

## (undated) RX ORDER — ROPIVACAINE HYDROCHLORIDE 7.5 MG/ML
INJECTION, SOLUTION EPIDURAL; PERINEURAL
Status: DISPENSED
Start: 2018-03-09

## (undated) RX ORDER — OXYCODONE HYDROCHLORIDE 5 MG/1
TABLET ORAL
Status: DISPENSED
Start: 2018-11-05